# Patient Record
Sex: MALE | Race: WHITE
[De-identification: names, ages, dates, MRNs, and addresses within clinical notes are randomized per-mention and may not be internally consistent; named-entity substitution may affect disease eponyms.]

---

## 2020-07-17 ENCOUNTER — HOSPITAL ENCOUNTER (EMERGENCY)
Dept: HOSPITAL 7 - FB.ED | Age: 65
Discharge: HOME | End: 2020-07-17
Payer: COMMERCIAL

## 2020-07-17 DIAGNOSIS — S20.222A: Primary | ICD-10-CM
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
## 2020-07-17 DIAGNOSIS — S10.93XA: ICD-10-CM

## 2020-07-17 DIAGNOSIS — Z88.2: ICD-10-CM
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
